# Patient Record
Sex: FEMALE | Race: WHITE | NOT HISPANIC OR LATINO | Employment: STUDENT | URBAN - METROPOLITAN AREA
[De-identification: names, ages, dates, MRNs, and addresses within clinical notes are randomized per-mention and may not be internally consistent; named-entity substitution may affect disease eponyms.]

---
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
## 2024-06-21 ENCOUNTER — HOSPITAL ENCOUNTER (EMERGENCY)
Facility: CLINIC | Age: 24
Discharge: HOME | End: 2024-06-21
Attending: EMERGENCY MEDICINE | Admitting: EMERGENCY MEDICINE
Payer: COMMERCIAL

## 2024-06-21 ENCOUNTER — APPOINTMENT (OUTPATIENT)
Dept: GENERAL RADIOLOGY | Facility: CLINIC | Age: 24
End: 2024-06-21
Attending: EMERGENCY MEDICINE
Payer: COMMERCIAL

## 2024-06-21 VITALS
OXYGEN SATURATION: 98 % | DIASTOLIC BLOOD PRESSURE: 69 MMHG | RESPIRATION RATE: 20 BRPM | TEMPERATURE: 98.3 F | HEART RATE: 86 BPM | SYSTOLIC BLOOD PRESSURE: 102 MMHG

## 2024-06-21 DIAGNOSIS — R06.02 SHORTNESS OF BREATH: Primary | ICD-10-CM

## 2024-06-21 LAB
ALBUMIN SERPL BCG-MCNC: 4.7 G/DL (ref 3.5–5.2)
ALP SERPL-CCNC: 65 U/L (ref 40–150)
ALT SERPL W P-5'-P-CCNC: 19 U/L (ref 0–50)
ANION GAP SERPL CALCULATED.3IONS-SCNC: 10 MMOL/L (ref 7–15)
AST SERPL W P-5'-P-CCNC: 27 U/L (ref 0–45)
BASOPHILS # BLD AUTO: 0 10E3/UL (ref 0–0.2)
BASOPHILS NFR BLD AUTO: 1 %
BILIRUB SERPL-MCNC: 1 MG/DL
BUN SERPL-MCNC: 14.2 MG/DL (ref 6–20)
CALCIUM SERPL-MCNC: 10 MG/DL (ref 8.6–10)
CHLORIDE SERPL-SCNC: 104 MMOL/L (ref 98–107)
CREAT SERPL-MCNC: 0.73 MG/DL (ref 0.51–0.95)
D DIMER PPP FEU-MCNC: <0.27 UG/ML FEU (ref 0–0.5)
DEPRECATED HCO3 PLAS-SCNC: 25 MMOL/L (ref 22–29)
EGFRCR SERPLBLD CKD-EPI 2021: >90 ML/MIN/1.73M2
EOSINOPHIL # BLD AUTO: 0.1 10E3/UL (ref 0–0.7)
EOSINOPHIL NFR BLD AUTO: 2 %
ERYTHROCYTE [DISTWIDTH] IN BLOOD BY AUTOMATED COUNT: 12 % (ref 10–15)
GLUCOSE SERPL-MCNC: 90 MG/DL (ref 70–99)
HCT VFR BLD AUTO: 44.9 % (ref 35–47)
HGB BLD-MCNC: 15.4 G/DL (ref 11.7–15.7)
IMM GRANULOCYTES # BLD: 0 10E3/UL
IMM GRANULOCYTES NFR BLD: 0 %
LYMPHOCYTES # BLD AUTO: 1.5 10E3/UL (ref 0.8–5.3)
LYMPHOCYTES NFR BLD AUTO: 34 %
MCH RBC QN AUTO: 30.1 PG (ref 26.5–33)
MCHC RBC AUTO-ENTMCNC: 34.3 G/DL (ref 31.5–36.5)
MCV RBC AUTO: 88 FL (ref 78–100)
MONOCYTES # BLD AUTO: 0.4 10E3/UL (ref 0–1.3)
MONOCYTES NFR BLD AUTO: 9 %
NEUTROPHILS # BLD AUTO: 2.5 10E3/UL (ref 1.6–8.3)
NEUTROPHILS NFR BLD AUTO: 54 %
NRBC # BLD AUTO: 0 10E3/UL
NRBC BLD AUTO-RTO: 0 /100
PLATELET # BLD AUTO: 235 10E3/UL (ref 150–450)
POTASSIUM SERPL-SCNC: 4.2 MMOL/L (ref 3.4–5.3)
PROT SERPL-MCNC: 7.1 G/DL (ref 6.4–8.3)
RBC # BLD AUTO: 5.12 10E6/UL (ref 3.8–5.2)
SODIUM SERPL-SCNC: 139 MMOL/L (ref 135–145)
TSH SERPL DL<=0.005 MIU/L-ACNC: 1.23 UIU/ML (ref 0.3–4.2)
WBC # BLD AUTO: 4.5 10E3/UL (ref 4–11)

## 2024-06-21 PROCEDURE — 99285 EMERGENCY DEPT VISIT HI MDM: CPT | Mod: 25 | Performed by: EMERGENCY MEDICINE

## 2024-06-21 PROCEDURE — 93010 ELECTROCARDIOGRAM REPORT: CPT | Performed by: EMERGENCY MEDICINE

## 2024-06-21 PROCEDURE — 71046 X-RAY EXAM CHEST 2 VIEWS: CPT | Mod: 26 | Performed by: RADIOLOGY

## 2024-06-21 PROCEDURE — 80053 COMPREHEN METABOLIC PANEL: CPT | Performed by: EMERGENCY MEDICINE

## 2024-06-21 PROCEDURE — 99284 EMERGENCY DEPT VISIT MOD MDM: CPT | Performed by: EMERGENCY MEDICINE

## 2024-06-21 PROCEDURE — 93005 ELECTROCARDIOGRAM TRACING: CPT | Performed by: EMERGENCY MEDICINE

## 2024-06-21 PROCEDURE — 85025 COMPLETE CBC W/AUTO DIFF WBC: CPT | Performed by: EMERGENCY MEDICINE

## 2024-06-21 PROCEDURE — 71046 X-RAY EXAM CHEST 2 VIEWS: CPT

## 2024-06-21 PROCEDURE — 85379 FIBRIN DEGRADATION QUANT: CPT | Performed by: EMERGENCY MEDICINE

## 2024-06-21 PROCEDURE — 36415 COLL VENOUS BLD VENIPUNCTURE: CPT | Performed by: EMERGENCY MEDICINE

## 2024-06-21 PROCEDURE — 84443 ASSAY THYROID STIM HORMONE: CPT | Performed by: EMERGENCY MEDICINE

## 2024-06-21 RX ORDER — ALBUTEROL SULFATE 90 UG/1
2 AEROSOL, METERED RESPIRATORY (INHALATION) EVERY 6 HOURS PRN
Qty: 18 G | Refills: 0 | Status: SHIPPED | OUTPATIENT
Start: 2024-06-21

## 2024-06-21 ASSESSMENT — ACTIVITIES OF DAILY LIVING (ADL)
ADLS_ACUITY_SCORE: 35

## 2024-06-21 ASSESSMENT — COLUMBIA-SUICIDE SEVERITY RATING SCALE - C-SSRS
6. HAVE YOU EVER DONE ANYTHING, STARTED TO DO ANYTHING, OR PREPARED TO DO ANYTHING TO END YOUR LIFE?: NO
2. HAVE YOU ACTUALLY HAD ANY THOUGHTS OF KILLING YOURSELF IN THE PAST MONTH?: NO
1. IN THE PAST MONTH, HAVE YOU WISHED YOU WERE DEAD OR WISHED YOU COULD GO TO SLEEP AND NOT WAKE UP?: NO

## 2024-06-21 NOTE — ED NOTES
Patient discharged accompanied by a friend. Patient verbalized understanding of discharge instructions including medication administration and follow up care as noted on AVS. Printed prescription given. PIV removed.

## 2024-06-21 NOTE — ED PROVIDER NOTES
[place] EMERGENCY DEPARTMENT ([facility])    6/21/24       ED PROVIDER NOTE     History   No chief complaint on file.    The history is provided by the patient and medical records.     Jessica Aranda is a 23 year old female who presents to the Emergency Department with shortness of breath.  She states she has noticed this for the past several weeks.  Feels that it has worsened today.  She notes that with exertion she feels like she needs to take an extra breath.  No coughing with this.  No known precipitating factors however she states she did have influenza several weeks ago and this may have contributed to it.  Patient has had 1 similar occurrence of shortness of breath in the past after COVID infection.  No recent travel or immobilization.  No coughing or current infectious symptoms.  No pain.  No other symptoms noted.    Past Medical History  No past medical history on file.  No past surgical history on file.  albuterol (PROAIR HFA/PROVENTIL HFA/VENTOLIN HFA) 108 (90 Base) MCG/ACT inhaler  calcium carbonate (TUMS) 500 MG chewable tablet  ranitidine (ZANTAC) 150 MG tablet      Allergies   Allergen Reactions    Animal Dander Unknown    Field Mint [[place]] Unknown     Family History  No family history on file.  Social History   Social History     Tobacco Use    Smoking status: Never    Smokeless tobacco: Never   Substance Use Topics    Alcohol use: No    Drug use: No      Past medical history, past surgical history, medications, allergies, family history, and social history were reviewed with the patient. No additional pertinent items.   A medically appropriate review of systems was performed with pertinent positives and negatives noted in the HPI, and all other systems negative.    Physical Exam      Physical Exam  Constitutional:       General: She is not in acute distress.     Appearance: She is well-developed. She is not diaphoretic.   HENT:      Head: Normocephalic and atraumatic.       Mouth/Throat:      Pharynx: No oropharyngeal exudate.   Eyes:      General: No scleral icterus.        Right eye: No discharge.         Left eye: No discharge.      Pupils: Pupils are equal, round, and reactive to light.   Cardiovascular:      Rate and Rhythm: Normal rate and regular rhythm.      Heart sounds: Normal heart sounds. No murmur heard.     No friction rub. No gallop.   Pulmonary:      Effort: Pulmonary effort is normal. No respiratory distress.      Breath sounds: Normal breath sounds. No wheezing.   Chest:      Chest wall: No tenderness.   Abdominal:      General: Bowel sounds are normal. There is no distension.      Palpations: Abdomen is soft.      Tenderness: There is no abdominal tenderness.   Musculoskeletal:         General: No tenderness or deformity. Normal range of motion.      Cervical back: Normal range of motion and neck supple.   Skin:     General: Skin is warm and dry.      Coloration: Skin is not pale.      Findings: No erythema or rash.   Neurological:      Mental Status: She is alert and oriented to person, place, and time.      Cranial Nerves: No cranial nerve deficit.           ED Course, Procedures, & Data      Procedures            EKG Interpretation:      Interpreted by Ritchie Natarajan DO  Time reviewed: 1449  Symptoms at time of EKG: shortness of breath   Rhythm: normal sinus   Rate: 82  Axis: Left Axis Deviation  Ectopy: none  Conduction: normal  ST Segments/ T Waves: No ST-T wave changes  Q Waves: none  Comparison to prior: No old EKG available    Clinical Impression: no acute abnormalities                 No results found for any visits on 06/21/24.  Medications - No data to display  Labs Ordered and Resulted from Time of ED Arrival to Time of ED Departure - No data to display  No orders to display              Assessment & Plan    Is a 23-year-old female presents with shortness of breath.  This has been present for the past several weeks.  This is most notable with exertion  where she feels like she needs to take an extra breath.  She did have influenza several weeks ago which may have been a precipitating factor.  She states she has had 1 similar occurrence with COVID infection in the past.  Exam demonstrates no acute abnormalities.  ECG and chest x-ray show no acute abnormalities.  Lab work including D-dimer show no acute abnormality.  I discussed all results with patient.  Symptoms could be secondary to post influenza syndrome.  We will provide prescription to trial a course of albuterol.  Patient does not have primary care physician and I discussed that she may need further workup for this.  Primary care referral was placed.  We will discharge with return precautions.    I have reviewed the nursing notes. I have reviewed the findings, diagnosis, plan and need for follow up with the patient.    New Prescriptions    No medications on file       Final diagnoses:   None       Ritchie Natarajan DO  [facility] EMERGENCY DEPARTMENT  6/21/2024     Ritchie Natarajan DO  06/21/24 8125

## 2024-06-21 NOTE — ED TRIAGE NOTES
"Pt c/o SOB that has been getting worse for the past month. Pt was \"gasping for breath\" when she woke up and winded when talking to people.      Triage Assessment (Adult)       Row Name 06/21/24 8853          Triage Assessment    Airway WDL WDL        Respiratory WDL    Respiratory WDL X;rhythm/pattern     Rhythm/Pattern, Respiratory shortness of breath        Skin Circulation/Temperature WDL    Skin Circulation/Temperature WDL WDL        Cardiac WDL    Cardiac WDL WDL        Peripheral/Neurovascular WDL    Peripheral Neurovascular WDL WDL        Cognitive/Neuro/Behavioral WDL    Cognitive/Neuro/Behavioral WDL WDL                     "

## 2024-06-22 LAB
ATRIAL RATE - MUSE: 82 BPM
DIASTOLIC BLOOD PRESSURE - MUSE: NORMAL MMHG
INTERPRETATION ECG - MUSE: NORMAL
P AXIS - MUSE: NORMAL DEGREES
PR INTERVAL - MUSE: 126 MS
QRS DURATION - MUSE: 82 MS
QT - MUSE: 368 MS
QTC - MUSE: 429 MS
R AXIS - MUSE: -87 DEGREES
SYSTOLIC BLOOD PRESSURE - MUSE: NORMAL MMHG
T AXIS - MUSE: 82 DEGREES
VENTRICULAR RATE- MUSE: 82 BPM

## 2024-09-21 ENCOUNTER — HEALTH MAINTENANCE LETTER (OUTPATIENT)
Age: 24
End: 2024-09-21

## 2024-10-28 ENCOUNTER — OFFICE VISIT (OUTPATIENT)
Dept: ALLERGY | Facility: CLINIC | Age: 24
End: 2024-10-28
Payer: COMMERCIAL

## 2024-10-28 VITALS
WEIGHT: 104 LBS | BODY MASS INDEX: 17.33 KG/M2 | OXYGEN SATURATION: 97 % | HEART RATE: 87 BPM | HEIGHT: 65 IN | RESPIRATION RATE: 18 BRPM

## 2024-10-28 DIAGNOSIS — J30.1 SEASONAL ALLERGIC RHINITIS DUE TO POLLEN: Primary | ICD-10-CM

## 2024-10-28 DIAGNOSIS — T78.1XXA ADVERSE FOOD REACTION, INITIAL ENCOUNTER: ICD-10-CM

## 2024-10-28 DIAGNOSIS — R11.2 NAUSEA AND VOMITING, UNSPECIFIED VOMITING TYPE: ICD-10-CM

## 2024-10-28 PROCEDURE — 99000 SPECIMEN HANDLING OFFICE-LAB: CPT | Performed by: ALLERGY & IMMUNOLOGY

## 2024-10-28 PROCEDURE — 86003 ALLG SPEC IGE CRUDE XTRC EA: CPT | Mod: 90 | Performed by: ALLERGY & IMMUNOLOGY

## 2024-10-28 PROCEDURE — 82785 ASSAY OF IGE: CPT | Performed by: ALLERGY & IMMUNOLOGY

## 2024-10-28 PROCEDURE — 36415 COLL VENOUS BLD VENIPUNCTURE: CPT | Performed by: ALLERGY & IMMUNOLOGY

## 2024-10-28 PROCEDURE — 99204 OFFICE O/P NEW MOD 45 MIN: CPT | Performed by: ALLERGY & IMMUNOLOGY

## 2024-10-28 RX ORDER — FAMOTIDINE 20 MG/1
20 TABLET, FILM COATED ORAL 2 TIMES DAILY PRN
COMMUNITY
Start: 2024-10-14

## 2024-10-28 RX ORDER — DILTIAZEM HYDROCHLORIDE 60 MG/1
2 TABLET, FILM COATED ORAL
COMMUNITY
Start: 2024-06-24

## 2024-10-28 NOTE — LETTER
10/28/2024      Jessica Aranda  [id number] [address] [place] [address]      Dear Colleague,    Thank you for referring your patient, Jessica Aranda, to the [facility]. Please see a copy of my visit note below.          Subjective  Jessica is a 24 year old, presenting for the following health issues:  Allergy Consult (Allergy concerns, breathing issues in the summer which resulted in ER, vomiting no known source saw  thought it was heartburn, still vomiting. Concerned about a wheat allergy. )    HPI   Chief complaint: Breathing concerns, vomiting    History of present illness: This is a pleasant 24-year-old woman here today to discuss possible food allergy.  She states in the summer this year she started having shortness of breath.  She states would be difficult to walk long periods of time she would feel like she could not get enough air.  She states that she would have some wheezing at times.  She was started on a Symbicort inhaler which she was using 2 puffs twice daily, 80/4.5.  She was also given albuterol.  With using this regularly the shortness of breath resolved and now she is using it only as needed.  She is never had asthma before.  She notes now in August she has started to wake up in the morning with a sick stomach.  She states she will be very nauseated and she will have to vomit.  Sometimes this happens at night as well.  She does note some drainage down the back of her throat.  No hives, swelling or shortness of breath when this occurs.  No history of dysphagia.  This does not happen in association with eating.  She wonders if this is something she is eating causing the symptoms, however.  She does not have a primary care physician currently as she is a student at the [employer].  Recently saw physician who started her on famotidine for possible reflux but this has not helped.  She does note prior to this occurring in August she would note that lactose-containing  "products would cause some stomach upset.      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  Physical Exam   Gen: Pleasant female not in acute distress  HEENT: Eyes no erythema of the bulbar or palpebral conjunctiva, no edema. Nose: No congestion, mucosa normal. Mouth: Throat clear, no lip or tongue edema.   Respiratory: Clear to auscultation bilaterally, no adventitious breath sounds  Skin: No rashes or lesions  Psych: Alert and oriented times 3    Impression report and plan:   Mild persistent asthma  Nausea and vomiting  Symptoms are not consistent with an IgE mediated food allergy.  However, will check IgE alpha galactose given that she has had a tick bite previously.  Recommend GI referral given that she does not have a primary care physician and the nausea and vomiting is causing her to miss school.  I am concerned about allergic rhinitis causing some drainage that could be leading to nausea.  Recommend specific IgE panel as she is currently on famotidine.  This could also correlate with her asthma.  I will follow-up with the patient once testing returns.    Signed Electronically by: Matilde Celestin MD      Again, thank you for allowing me to participate in the care of your patient.        Sincerely,        Matilde Celestin MD  "

## 2024-10-28 NOTE — PROGRESS NOTES
Justus Lopez is a 24 year old, presenting for the following health issues:  Allergy Consult (Allergy concerns, breathing issues in the summer which resulted in ER, vomiting no known source saw  thought it was heartburn, still vomiting. Concerned about a wheat allergy. )    HPI   Chief complaint: Breathing concerns, vomiting    History of present illness: This is a pleasant 24-year-old woman here today to discuss possible food allergy.  She states in the summer this year she started having shortness of breath.  She states would be difficult to walk long periods of time she would feel like she could not get enough air.  She states that she would have some wheezing at times.  She was started on a Symbicort inhaler which she was using 2 puffs twice daily, 80/4.5.  She was also given albuterol.  With using this regularly the shortness of breath resolved and now she is using it only as needed.  She is never had asthma before.  She notes now in August she has started to wake up in the morning with a sick stomach.  She states she will be very nauseated and she will have to vomit.  Sometimes this happens at night as well.  She does note some drainage down the back of her throat.  No hives, swelling or shortness of breath when this occurs.  No history of dysphagia.  This does not happen in association with eating.  She wonders if this is something she is eating causing the symptoms, however.  She does not have a primary care physician currently as she is a student at the [employer].  Recently saw physician who started her on famotidine for possible reflux but this has not helped.  She does note prior to this occurring in August she would note that lactose-containing products would cause some stomach upset.      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  Physical Exam   Gen: Pleasant female not in acute distress  HEENT: Eyes no erythema of the bulbar or palpebral conjunctiva, no edema. Nose: No congestion, mucosa normal. Mouth: Throat clear, no lip or tongue edema.   Respiratory: Clear to auscultation bilaterally, no adventitious breath sounds  Skin: No rashes or lesions  Psych: Alert and oriented times 3    Impression report and plan:   Mild persistent asthma  Nausea and vomiting  Symptoms are not consistent with an IgE mediated food allergy.  However, will check IgE alpha galactose given that she has had a tick bite previously.  Recommend GI referral given that she does not have a primary care physician and the nausea and vomiting is causing her to miss school.  I am concerned about allergic rhinitis causing some drainage that could be leading to nausea.  Recommend specific IgE panel as she is currently on famotidine.  This could also correlate with her asthma.  I will follow-up with the patient once testing returns.    Signed Electronically by: Matilde Celestin MD    "

## 2024-10-29 LAB
A ALTERNATA IGE QN: <0.1 KU(A)/L
A FUMIGATUS IGE QN: <0.1 KU(A)/L
BERMUDA GRASS IGE QN: <0.1 KU(A)/L
C HERBARUM IGE QN: <0.1 KU(A)/L
CAT DANDER IGG QN: 2.76 KU(A)/L
CEDAR IGE QN: <0.1 KU(A)/L
COMMON RAGWEED IGE QN: <0.1 KU(A)/L
COTTONWOOD IGE QN: <0.1 KU(A)/L
D FARINAE IGE QN: <0.1 KU(A)/L
D PTERONYSS IGE QN: <0.1 KU(A)/L
DOG DANDER+EPITH IGE QN: 0.16 KU(A)/L
IGE SERPL-ACNC: 71 KU/L (ref 0–114)
MAPLE IGE QN: 6.06 KU(A)/L
MARSH ELDER IGE QN: <0.1 KU(A)/L
MOUSE URINE PROT IGE QN: <0.1 KU(A)/L
NETTLE IGE QN: <0.1 KU(A)/L
P NOTATUM IGE QN: <0.1 KU(A)/L
ROACH IGE QN: <0.1 KU(A)/L
SALTWORT IGE QN: <0.1 KU(A)/L
SILVER BIRCH IGE QN: 0.18 KU(A)/L
TIMOTHY IGE QN: <0.1 KU(A)/L
WHITE ASH IGE QN: <0.1 KU(A)/L
WHITE ELM IGE QN: <0.1 KU(A)/L
WHITE MULBERRY IGE QN: <0.1 KU(A)/L
WHITE OAK IGE QN: 0.97 KU(A)/L

## 2024-10-30 LAB
ALPHA-GAL IGE QN: <0.1 KU/L
DEPRECATED MISC ALLERGEN IGE RAST QL: NORMAL

## 2024-11-08 ENCOUNTER — TRANSFERRED RECORDS (OUTPATIENT)
Dept: HEALTH INFORMATION MANAGEMENT | Facility: CLINIC | Age: 24
End: 2024-11-08
Payer: COMMERCIAL